# Patient Record
Sex: FEMALE | Race: WHITE
[De-identification: names, ages, dates, MRNs, and addresses within clinical notes are randomized per-mention and may not be internally consistent; named-entity substitution may affect disease eponyms.]

---

## 2019-06-17 ENCOUNTER — HOSPITAL ENCOUNTER (EMERGENCY)
Dept: HOSPITAL 10 - E/R | Age: 64
Discharge: HOME | End: 2019-06-17
Payer: COMMERCIAL

## 2019-06-17 ENCOUNTER — HOSPITAL ENCOUNTER (EMERGENCY)
Dept: HOSPITAL 91 - E/R | Age: 64
Discharge: HOME | End: 2019-06-17
Payer: COMMERCIAL

## 2019-06-17 VITALS — RESPIRATION RATE: 17 BRPM | HEART RATE: 69 BPM | SYSTOLIC BLOOD PRESSURE: 190 MMHG | DIASTOLIC BLOOD PRESSURE: 82 MMHG

## 2019-06-17 VITALS
HEIGHT: 60 IN | BODY MASS INDEX: 37.83 KG/M2 | BODY MASS INDEX: 37.83 KG/M2 | WEIGHT: 192.68 LBS | WEIGHT: 192.68 LBS | HEIGHT: 60 IN

## 2019-06-17 DIAGNOSIS — I10: ICD-10-CM

## 2019-06-17 DIAGNOSIS — M79.604: Primary | ICD-10-CM

## 2019-06-17 LAB
ADD MAN DIFF?: NO
ALANINE AMINOTRANSFERASE: 61 IU/L (ref 13–69)
ALBUMIN/GLOBULIN RATIO: 1.1
ALBUMIN: 4.2 G/DL (ref 3.3–4.9)
ALKALINE PHOSPHATASE: 138 IU/L (ref 42–121)
ANION GAP: 8 (ref 5–13)
ASPARTATE AMINO TRANSFERASE: 130 IU/L (ref 15–46)
B-TYPE NATRIURETIC PEPTIDE: 586 PG/ML (ref 0–125)
BASOPHIL #: 0.1 10^3/UL (ref 0–0.1)
BASOPHILS %: 0.9 % (ref 0–2)
BILIRUBIN,DIRECT: 0 MG/DL (ref 0–0.2)
BILIRUBIN,TOTAL: 0.3 MG/DL (ref 0.2–1.3)
BLOOD UREA NITROGEN: 29 MG/DL (ref 7–20)
CALCIUM: 9 MG/DL (ref 8.4–10.2)
CARBON DIOXIDE: 30 MMOL/L (ref 21–31)
CHLORIDE: 105 MMOL/L (ref 97–110)
CREATININE: 0.84 MG/DL (ref 0.44–1)
EOSINOPHILS #: 0.1 10^3/UL (ref 0–0.5)
EOSINOPHILS %: 1.5 % (ref 0–7)
GLOBULIN: 3.8 G/DL (ref 1.3–3.2)
GLUCOSE: 165 MG/DL (ref 70–220)
HEMATOCRIT: 45.7 % (ref 37–47)
HEMOGLOBIN: 14.7 G/DL (ref 12–16)
IMMATURE GRANS #M: 0.03 10^3/UL (ref 0–0.03)
IMMATURE GRANS % (M): 0.3 % (ref 0–0.43)
INR: 0.86
LYMPHOCYTES #: 3.9 10^3/UL (ref 0.8–2.9)
LYMPHOCYTES %: 44.5 % (ref 15–51)
MEAN CORPUSCULAR HEMOGLOBIN: 29.1 PG (ref 29–33)
MEAN CORPUSCULAR HGB CONC: 32.2 G/DL (ref 32–37)
MEAN CORPUSCULAR VOLUME: 90.5 FL (ref 82–101)
MEAN PLATELET VOLUME: 9.8 FL (ref 7.4–10.4)
MONOCYTE #: 0.6 10^3/UL (ref 0.3–0.9)
MONOCYTES %: 6.5 % (ref 0–11)
NEUTROPHIL #: 4.1 10^3/UL (ref 1.6–7.5)
NEUTROPHILS %: 46.3 % (ref 39–77)
NUCLEATED RED BLOOD CELLS #: 0 10^3/UL (ref 0–0)
NUCLEATED RED BLOOD CELLS%: 0 /100WBC (ref 0–0)
PARTIAL THROMBOPLASTIN TIME: 27.6 SEC (ref 23–35)
PLATELET COUNT: 243 10^3/UL (ref 140–415)
POTASSIUM: 4.5 MMOL/L (ref 3.5–5.1)
PROTIME: 11.8 SEC (ref 11.9–14.9)
PT RATIO: 0.9
RED BLOOD COUNT: 5.05 10^6/UL (ref 4.2–5.4)
RED CELL DISTRIBUTION WIDTH: 13.1 % (ref 11.5–14.5)
SODIUM: 143 MMOL/L (ref 135–144)
TOTAL PROTEIN: 8 G/DL (ref 6.1–8.1)
TROPONIN-I: < 0.012 NG/ML (ref 0–0.12)
WHITE BLOOD COUNT: 8.8 10^3/UL (ref 4.8–10.8)

## 2019-06-17 PROCEDURE — 84484 ASSAY OF TROPONIN QUANT: CPT

## 2019-06-17 PROCEDURE — 71045 X-RAY EXAM CHEST 1 VIEW: CPT

## 2019-06-17 PROCEDURE — 85610 PROTHROMBIN TIME: CPT

## 2019-06-17 PROCEDURE — 85730 THROMBOPLASTIN TIME PARTIAL: CPT

## 2019-06-17 PROCEDURE — 83880 ASSAY OF NATRIURETIC PEPTIDE: CPT

## 2019-06-17 PROCEDURE — 36415 COLL VENOUS BLD VENIPUNCTURE: CPT

## 2019-06-17 PROCEDURE — 93005 ELECTROCARDIOGRAM TRACING: CPT

## 2019-06-17 PROCEDURE — 80053 COMPREHEN METABOLIC PANEL: CPT

## 2019-06-17 PROCEDURE — 99285 EMERGENCY DEPT VISIT HI MDM: CPT

## 2019-06-17 PROCEDURE — 73510: CPT

## 2019-06-17 PROCEDURE — 93971 EXTREMITY STUDY: CPT

## 2019-06-17 PROCEDURE — 85025 COMPLETE CBC W/AUTO DIFF WBC: CPT

## 2019-06-17 NOTE — ERD
ER Documentation


Chief Complaint


Chief Complaint





right leg pain x1 week radiates to knee





HPI


Is a very pleasant 65-year-old female with right leg pain and right foot along 


with the right extremity.  She denies fevers chills nausea vomiting pain is mild


to moderate intensity but denies any leg swelling.  Denies recent travel.  Denie


s direct trauma.  Denies any other current complaints.





ROS


All systems reviewed and are negative except as per history of present illness.





PMhx/Soc


History of Surgery:  Yes (RIGHT LEG SX)


Anesthesia Reaction:  No


Hx Neurological Disorder:  No


Hx Respiratory Disorders:  No


Hx Cardiac Disorders:  Yes (HTN)


Hx Psychiatric Problems:  No


Hx Miscellaneous Medical Probl:  Yes (THYROID)


Hx Alcohol Use:  No


Hx Substance Use:  No


Hx Tobacco Use:  No


Smoking Status:  Never smoker





Physical Exam


Vitals





Vital Signs


  Date      Temp  Pulse  Resp  B/P (MAP)   Pulse Ox  O2          O2 Flow    FiO2


Time                                                 Delivery    Rate


   6/17/19           77    19      226/95        97  Room Air


     01:29                          (138)


   6/17/19  97.5    174    21     224/147


     01:02                          (172)





Physical Exam


Const:   No acute distress


Head:   Atraumatic 


Eyes:    Normal Conjunctiva


ENT:    Normal External Ears, Nose and Mouth.


Neck:               Full range of motion. No meningismus.


Resp:   Clear to auscultation bilaterally


Cardio:   Regular rate and rhythm, no murmurs


Abd:    Soft, non tender, non distended. Normal bowel sounds


Skin:   No petechiae or rashes


Back:   No midline or flank tenderness


Ext:    No cyanosis, or edema


Neur:   Awake and alert


Psych:    Normal Mood and Affect


Result Diagram:  


6/17/19 0127                                                                    


           6/17/19 0127





Results 24 hrs





Laboratory Tests


              Test
                                 6/17/19
01:27


              White Blood Count                      8.8 10^3/ul


              Red Blood Count                       5.05 10^6/ul


              Hemoglobin                               14.7 g/dl


              Hematocrit                                  45.7 %


              Mean Corpuscular Volume                    90.5 fl


              Mean Corpuscular Hemoglobin                29.1 pg


              Mean Corpuscular Hemoglobin
Concent     32.2 g/dl 



              Red Cell Distribution Width                 13.1 %


              Platelet Count                         243 10^3/UL


              Mean Platelet Volume                        9.8 fl


              Immature Granulocytes %                    0.300 %


              Neutrophils %                               46.3 %


              Lymphocytes %                               44.5 %


              Monocytes %                                  6.5 %


              Eosinophils %                                1.5 %


              Basophils %                                  0.9 %


              Nucleated Red Blood Cells %            0.0 /100WBC


              Immature Granulocytes #              0.030 10^3/ul


              Neutrophils #                          4.1 10^3/ul


              Lymphocytes #                          3.9 10^3/ul


              Monocytes #                            0.6 10^3/ul


              Eosinophils #                          0.1 10^3/ul


              Basophils #                            0.1 10^3/ul


              Nucleated Red Blood Cells #            0.0 10^3/ul


              Prothrombin Time                          11.8 Sec


              Prothrombin Time Ratio                         0.9


              INR International Normalized
Ratio           0.86 



              Activated Partial
Thromboplast Time      27.6 Sec 



              Sodium Level                            143 mmol/L


              Potassium Level                         4.5 mmol/L


              Chloride Level                          105 mmol/L


              Carbon Dioxide Level                     30 mmol/L


              Anion Gap                                        8


              Blood Urea Nitrogen                       29 mg/dl


              Creatinine                              0.84 mg/dl


              Est Glomerular Filtrat Rate
mL/min   > 60 mL/min 



              Glucose Level                            165 mg/dl


              Calcium Level                            9.0 mg/dl


              Total Bilirubin                          0.3 mg/dl


              Direct Bilirubin                        0.00 mg/dl


              Indirect Bilirubin                       0.3 mg/dl


              Aspartate Amino Transf
(AST/SGOT)        130 IU/L 



              Alanine Aminotransferase
(ALT/SGPT)       61 IU/L 



              Alkaline Phosphatase                      138 IU/L


              Troponin I                           < 0.012 ng/ml


              B-Type Natriuretic Peptide               586 PG/ML


              Total Protein                             8.0 g/dl


              Albumin                                   4.2 g/dl


              Globulin                                 3.80 g/dl


              Albumin/Globulin Ratio                        1.10








Procedures/MDM


EKG: 


Rate/Rhythm:             [Normal Sinus Rhythm]


QRS, ST, T-waves:    [No changes consistent w/ acute ischemia]


Impression:      [No evidence of ischemia or arrhythmia]





X-ray Hip 2V Interpreted by me: 


Bones:        [No fracture]


Joints:      [No dislocation]


Foreign body:   [None]





Chest X-ray 1V Interpreted by me:


Soft Tissue:                                               No acute 


abnormalities


Bones:                                                    No acute abnormalities


Mediastinum/Cardiac Silhouette/Lungs:     [No acute abnormalities]





DVT study is negative





Medical decision making: Very pleasant patient comes in with right lower 


extremity pain.  No evidence of DVT or fracture.  Stable for trial of outpatient


management.





Departure


Diagnosis:  


   Primary Impression:  


   Pain of right leg


Condition:  Stable











EVA SEGAL             Jun 17, 2019 03:09